# Patient Record
Sex: FEMALE | URBAN - METROPOLITAN AREA
[De-identification: names, ages, dates, MRNs, and addresses within clinical notes are randomized per-mention and may not be internally consistent; named-entity substitution may affect disease eponyms.]

---

## 2024-07-09 ENCOUNTER — TELEPHONE (OUTPATIENT)
Dept: FAMILY MEDICINE CLINIC | Facility: CLINIC | Age: 29
End: 2024-07-09

## 2024-07-09 NOTE — TELEPHONE ENCOUNTER
Add 91896 Cpt? (Important Note: In 2017 The Use Of 76576 Is Being Tracked By Cms To Determine Future Global Period Reimbursement For Global Periods): yes
Patient's  asked Dr Latham if he would take her on as a patient and he agreed. She will call to set up her appointment.  
Detail Level: Detailed

## 2024-09-11 ENCOUNTER — OFFICE VISIT (OUTPATIENT)
Dept: FAMILY MEDICINE CLINIC | Facility: CLINIC | Age: 29
End: 2024-09-11
Payer: COMMERCIAL

## 2024-09-11 VITALS
HEART RATE: 63 BPM | BODY MASS INDEX: 25.37 KG/M2 | HEIGHT: 64 IN | TEMPERATURE: 98.7 F | OXYGEN SATURATION: 97 % | DIASTOLIC BLOOD PRESSURE: 64 MMHG | WEIGHT: 148.6 LBS | SYSTOLIC BLOOD PRESSURE: 90 MMHG

## 2024-09-11 DIAGNOSIS — H60.501 ACUTE OTITIS EXTERNA OF RIGHT EAR, UNSPECIFIED TYPE: Primary | ICD-10-CM

## 2024-09-11 DIAGNOSIS — Z30.41 ORAL CONTRACEPTIVE PILL SURVEILLANCE: ICD-10-CM

## 2024-09-11 LAB
ALBUMIN SERPL-MCNC: 4.2 G/DL (ref 3.5–5.2)
ALBUMIN/GLOB SERPL: 1.7 G/DL
ALP SERPL-CCNC: 66 U/L (ref 39–117)
ALT SERPL-CCNC: 33 U/L (ref 1–33)
AST SERPL-CCNC: 20 U/L (ref 1–32)
BASOPHILS # BLD AUTO: 0.02 10*3/MM3 (ref 0–0.2)
BASOPHILS NFR BLD AUTO: 0.4 % (ref 0–1.5)
BILIRUB SERPL-MCNC: 0.3 MG/DL (ref 0–1.2)
BUN SERPL-MCNC: 11 MG/DL (ref 6–20)
BUN/CREAT SERPL: 18 (ref 7–25)
CALCIUM SERPL-MCNC: 9.1 MG/DL (ref 8.6–10.5)
CHLORIDE SERPL-SCNC: 105 MMOL/L (ref 98–107)
CHOLEST SERPL-MCNC: 153 MG/DL (ref 0–200)
CO2 SERPL-SCNC: 24.7 MMOL/L (ref 22–29)
CREAT SERPL-MCNC: 0.61 MG/DL (ref 0.57–1)
EGFRCR SERPLBLD CKD-EPI 2021: 122.8 ML/MIN/1.73
EOSINOPHIL # BLD AUTO: 0.08 10*3/MM3 (ref 0–0.4)
EOSINOPHIL NFR BLD AUTO: 1.7 % (ref 0.3–6.2)
ERYTHROCYTE [DISTWIDTH] IN BLOOD BY AUTOMATED COUNT: 12 % (ref 12.3–15.4)
GLOBULIN SER CALC-MCNC: 2.5 GM/DL
GLUCOSE SERPL-MCNC: 90 MG/DL (ref 65–99)
HCT VFR BLD AUTO: 37.2 % (ref 34–46.6)
HDLC SERPL-MCNC: 55 MG/DL (ref 40–60)
HGB BLD-MCNC: 12.4 G/DL (ref 12–15.9)
IMM GRANULOCYTES # BLD AUTO: 0.01 10*3/MM3 (ref 0–0.05)
IMM GRANULOCYTES NFR BLD AUTO: 0.2 % (ref 0–0.5)
LDLC SERPL CALC-MCNC: 82 MG/DL (ref 0–100)
LYMPHOCYTES # BLD AUTO: 1.98 10*3/MM3 (ref 0.7–3.1)
LYMPHOCYTES NFR BLD AUTO: 41.3 % (ref 19.6–45.3)
MCH RBC QN AUTO: 27.9 PG (ref 26.6–33)
MCHC RBC AUTO-ENTMCNC: 33.3 G/DL (ref 31.5–35.7)
MCV RBC AUTO: 83.6 FL (ref 79–97)
MONOCYTES # BLD AUTO: 0.26 10*3/MM3 (ref 0.1–0.9)
MONOCYTES NFR BLD AUTO: 5.4 % (ref 5–12)
NEUTROPHILS # BLD AUTO: 2.44 10*3/MM3 (ref 1.7–7)
NEUTROPHILS NFR BLD AUTO: 51 % (ref 42.7–76)
NRBC BLD AUTO-RTO: 0 /100 WBC (ref 0–0.2)
PLATELET # BLD AUTO: 281 10*3/MM3 (ref 140–450)
POTASSIUM SERPL-SCNC: 3.9 MMOL/L (ref 3.5–5.2)
PROT SERPL-MCNC: 6.7 G/DL (ref 6–8.5)
RBC # BLD AUTO: 4.45 10*6/MM3 (ref 3.77–5.28)
SODIUM SERPL-SCNC: 140 MMOL/L (ref 136–145)
TRIGL SERPL-MCNC: 83 MG/DL (ref 0–150)
TSH SERPL DL<=0.005 MIU/L-ACNC: 2.44 UIU/ML (ref 0.27–4.2)
VLDLC SERPL CALC-MCNC: 16 MG/DL (ref 5–40)
WBC # BLD AUTO: 4.79 10*3/MM3 (ref 3.4–10.8)

## 2024-09-11 PROCEDURE — 99204 OFFICE O/P NEW MOD 45 MIN: CPT

## 2024-09-11 RX ORDER — DROSPIRENONE AND ETHINYL ESTRADIOL 0.02-3(28)
1 KIT ORAL DAILY
COMMUNITY
End: 2024-09-11 | Stop reason: SDUPTHER

## 2024-09-11 RX ORDER — DROSPIRENONE AND ETHINYL ESTRADIOL 0.02-3(28)
1 KIT ORAL DAILY
Qty: 28 TABLET | Refills: 11 | Status: SHIPPED | OUTPATIENT
Start: 2024-09-11

## 2024-09-11 RX ORDER — DROSPIRENONE AND ETHINYL ESTRADIOL TABLETS 0.02-3(28)
KIT ORAL
COMMUNITY
Start: 2024-05-28 | End: 2024-09-11

## 2024-09-11 RX ORDER — NEOMYCIN SULFATE, POLYMYXIN B SULFATE AND HYDROCORTISONE 10; 3.5; 1 MG/ML; MG/ML; [USP'U]/ML
3 SUSPENSION/ DROPS AURICULAR (OTIC) 4 TIMES DAILY
Qty: 10 ML | Refills: 0 | Status: SHIPPED | OUTPATIENT
Start: 2024-09-11

## 2024-09-11 NOTE — LETTER
September 11, 2024     Patient: Kem Frederick   YOB: 1995   Date of Visit: 9/11/2024       To Whom It May Concern:    I have examined Kem Frederick. It is my medical opinion that she is cleared to return to work. Please contact me should you have any questions.          Sincerely,        ATUL Maldonado    CC: No Recipients

## 2024-09-11 NOTE — PROGRESS NOTES
"Chief Complaint  Earache    Subjective          History of Present Illness    Kem Frederick 29 y.o. female presents today for a new patient appointment. She is here to establish care, is here for their URI symptoms, and is a new patient to me. I reviewed the PFSH recorded today by my MA/LPN staff.       Ms. Frederick had a sore throat, fever, mild productive cough with yellow sputum, and bilateral ear pain last week. TMAX was 102.5 F and did improve after taking Tylenol. Covid was negative X2. She increased fluids and rested at home. Today, she reports symptoms have improved. She is still experiencing mild bilateral ear pain and clear sputum production. No fever since Thursday. She is a  with [employer].         Review of Systems   Constitutional:  Negative for chills, fatigue and fever.   HENT:  Positive for ear pain (bilateral, improved). Negative for congestion, ear discharge, hearing loss, postnasal drip, rhinorrhea, sinus pressure, sneezing, sore throat, tinnitus and trouble swallowing.    Eyes:  Negative for visual disturbance.   Respiratory:  Positive for cough (sputum production). Negative for chest tightness, shortness of breath and wheezing.    Cardiovascular:  Negative for chest pain and palpitations.   Gastrointestinal:  Negative for abdominal pain, constipation, diarrhea, nausea and vomiting.   Genitourinary:  Negative for dysuria, frequency and urgency.   Musculoskeletal:  Negative for back pain.   Skin:  Negative for rash.   Neurological:  Negative for dizziness, syncope and light-headedness.   Psychiatric/Behavioral:  Negative for behavioral problems and sleep disturbance.         Objective   Vital Signs:   BP 90/64 (BP Location: Left arm, Patient Position: Sitting, Cuff Size: Adult)   Pulse 63   Temp 98.7 °F (37.1 °C) (Oral)   Ht 162.6 cm (64\")   Wt 67.4 kg (148 lb 9.6 oz)   SpO2 97%   BMI 25.51 kg/m²      BMI is >= 25 and <30. (Overweight) The following options were offered " after discussion;: exercise counseling/recommendations and nutrition counseling/recommendations       Physical Exam  Vitals and nursing note reviewed.   Constitutional:       General: She is not in acute distress.     Appearance: She is well-developed and overweight. She is not ill-appearing.   HENT:      Head: Normocephalic and atraumatic. Hair is normal.      Right Ear: External ear normal. No decreased hearing noted. No drainage, swelling or tenderness. There is impacted cerumen (mild debris). Tympanic membrane is not injected, erythematous or retracted.      Left Ear: External ear normal. No decreased hearing noted. No drainage, swelling or tenderness. There is impacted cerumen (mild debris). Tympanic membrane is retracted. Tympanic membrane is not injected or erythematous.      Ears:      Comments: Erythema of right ear canal.     Nose: No mucosal edema or rhinorrhea.      Right Turbinates: Not enlarged or swollen.      Left Turbinates: Not enlarged or swollen.      Mouth/Throat:      Mouth: Mucous membranes are moist. No oral lesions.      Pharynx: Uvula midline. No pharyngeal swelling, oropharyngeal exudate, posterior oropharyngeal erythema or uvula swelling.      Tonsils: No tonsillar exudate or tonsillar abscesses.   Eyes:      General: No scleral icterus.        Right eye: No discharge.         Left eye: No discharge.      Conjunctiva/sclera: Conjunctivae normal.      Pupils: Pupils are equal, round, and reactive to light.   Cardiovascular:      Rate and Rhythm: Normal rate and regular rhythm.      Heart sounds: Normal heart sounds.   Pulmonary:      Effort: No respiratory distress.      Breath sounds: Normal breath sounds. No stridor or decreased air movement. No decreased breath sounds, wheezing, rhonchi or rales.   Musculoskeletal:      Cervical back: Normal range of motion and neck supple.   Lymphadenopathy:      Cervical: No cervical adenopathy.   Skin:     General: Skin is warm and dry.       Findings: No rash.   Neurological:      Mental Status: She is alert and oriented to person, place, and time.      Motor: No abnormal muscle tone.   Psychiatric:         Mood and Affect: Mood normal.         Behavior: Behavior normal.                         Assessment and Plan      Assessment & Plan  Acute otitis externa of right ear, unspecified type  Eardrops as directed  Keep ears clean and dry, avoid Q-tips  Return if no improvement  Oral contraceptive pill surveillance  OCP's as directed    Orders Placed This Encounter   Procedures    Comprehensive metabolic panel    Lipid panel    TSH    CBC and Differential     New Medications Ordered This Visit   Medications    neomycin-polymyxin-hydrocortisone (CORTISPORIN) 3.5-68628-6 otic suspension     Sig: Administer 3 drops to the right ear 4 (Four) Times a Day. For 7 days     Dispense:  10 mL     Refill:  0    drospirenone-ethinyl estradiol (LILO) 3-0.02 MG per tablet     Sig: Take 1 tablet by mouth Daily.     Dispense:  28 tablet     Refill:  11              Follow Up     Return if symptoms worsen or fail to improve.    Patient was given instructions and counseling regarding her condition or for health maintenance advice. Please see specific information pulled into the AVS if appropriate.

## 2024-11-04 ENCOUNTER — OFFICE VISIT (OUTPATIENT)
Dept: FAMILY MEDICINE CLINIC | Facility: CLINIC | Age: 29
End: 2024-11-04
Payer: COMMERCIAL

## 2024-11-04 VITALS
SYSTOLIC BLOOD PRESSURE: 110 MMHG | WEIGHT: 145 LBS | HEART RATE: 68 BPM | DIASTOLIC BLOOD PRESSURE: 66 MMHG | BODY MASS INDEX: 24.75 KG/M2 | OXYGEN SATURATION: 99 % | HEIGHT: 64 IN

## 2024-11-04 DIAGNOSIS — H92.03 OTALGIA OF BOTH EARS: Primary | ICD-10-CM

## 2024-11-04 DIAGNOSIS — R06.00 PND (PAROXYSMAL NOCTURNAL DYSPNEA): ICD-10-CM

## 2024-11-04 PROCEDURE — 99213 OFFICE O/P EST LOW 20 MIN: CPT | Performed by: FAMILY MEDICINE

## 2024-11-04 RX ORDER — NAPROXEN SODIUM 220 MG
220 TABLET ORAL 2 TIMES DAILY PRN
Start: 2024-11-04 | End: 2025-05-03

## 2024-11-04 RX ORDER — FEXOFENADINE HCL 180 MG/1
180 TABLET ORAL DAILY PRN
COMMUNITY
Start: 2024-11-04

## 2024-11-04 NOTE — PROGRESS NOTES
"Chief Complaint  Office Visit (Needs note to go back to work, from headache/earpain)    Subjective        HPI      The patient presents for evaluation of ear pain.    She is seeking a medical note to return to work. She experienced mild ear pain a few days ago, which has since resolved. She also had a cold and fever during her last visit on 09/11/2024. She reports no current symptoms of dizziness, fever, sweats, or chills. She denies any sensation of clogged ears, cough, or shortness of breath. Her primary symptom was mild dizziness accompanied by ear pain, with the left ear being more affected than the right.    SOCIAL HISTORY  She works as a .           Vital Signs:   Vitals:    11/04/24 1555   BP: 110/66   Pulse: 68   SpO2: 99%   Weight: 65.8 kg (145 lb)   Height: 162.6 cm (64\")            9/11/2024     8:26 AM   PHQ-2/PHQ-9 Depression Screening   Retired Little Interest or Pleasure in Doing Things 0-->not at all   Retired Feeling Down, Depressed or Hopeless 0-->not at all   Retired PHQ-9: Brief Depression Severity Measure Score 0       BMI is within normal parameters. No other follow-up for BMI required.        Physical Exam  Vitals reviewed.   Constitutional:       General: She is not in acute distress.  HENT:      Right Ear: Tympanic membrane and ear canal normal.      Left Ear: Tympanic membrane and ear canal normal.      Nose: No congestion or rhinorrhea.      Mouth/Throat:      Lips: Pink.      Mouth: Mucous membranes are moist. No oral lesions.      Dentition: Normal dentition.      Tongue: No lesions.      Pharynx: Postnasal drip present. No posterior oropharyngeal erythema.   Cardiovascular:      Rate and Rhythm: Normal rate and regular rhythm.      Heart sounds: Normal heart sounds.   Pulmonary:      Effort: Pulmonary effort is normal.      Breath sounds: Normal breath sounds.   Neurological:      General: No focal deficit present.      Mental Status: She is alert.   Psychiatric:         " Attention and Perception: Attention normal.         Mood and Affect: Mood normal.         Behavior: Behavior normal.          Ears and throat appear normal.             Results                  Assessment and Plan     Assessment & Plan  Otalgia of both ears  Condition resolved.  Cleared for resumption of work.  Note given.  Follow-up as needed.  Advised patient to use Allegra as needed over the next week and to use Aleve up to 60 minutes before flying over the next week to help with any remaining inflammation  Orders:    fexofenadine (Allegra Allergy) 180 MG tablet; Take 1 tablet by mouth Daily As Needed (allergy or congestion).    Aleve 220 MG tablet; Take 1 tablet by mouth 2 (Two) Times a Day As Needed (Prevention of ear congestion prior to flying) for up to 180 days. Take medication with food    PND (paroxysmal nocturnal dyspnea)    Orders:    fexofenadine (Allegra Allergy) 180 MG tablet; Take 1 tablet by mouth Daily As Needed (allergy or congestion).    Aleve 220 MG tablet; Take 1 tablet by mouth 2 (Two) Times a Day As Needed (Prevention of ear congestion prior to flying) for up to 180 days. Take medication with food        Return if symptoms worsen or fail to improve.     Patient was given instructions and counseling regarding her condition or for health maintenance advice. Please see specific information pulled into the AVS if appropriate.     Patient or patient representative verbalized consent for the use of Ambient Listening during the visit with  Scott Lisa MD for chart documentation. 11/4/2024  16:29 EST

## 2024-11-04 NOTE — LETTER
November 4, 2024     Patient: Kem Frederick   YOB: 1995   Date of Visit: 11/4/2024       To Whom It May Concern:    It is my medical opinion that Kem Frederick may return to work immediately with no work restrictions..         Sincerely,        Scott Lisa MD    CC: No Recipients

## 2025-04-28 ENCOUNTER — OFFICE VISIT (OUTPATIENT)
Dept: FAMILY MEDICINE CLINIC | Facility: CLINIC | Age: 30
End: 2025-04-28
Payer: COMMERCIAL

## 2025-04-28 ENCOUNTER — TELEPHONE (OUTPATIENT)
Dept: FAMILY MEDICINE CLINIC | Facility: CLINIC | Age: 30
End: 2025-04-28

## 2025-04-28 VITALS
HEART RATE: 75 BPM | HEIGHT: 64 IN | SYSTOLIC BLOOD PRESSURE: 110 MMHG | WEIGHT: 147 LBS | OXYGEN SATURATION: 98 % | TEMPERATURE: 98.1 F | BODY MASS INDEX: 25.1 KG/M2 | DIASTOLIC BLOOD PRESSURE: 70 MMHG

## 2025-04-28 DIAGNOSIS — G89.29 CHRONIC NONINTRACTABLE HEADACHE, UNSPECIFIED HEADACHE TYPE: Primary | ICD-10-CM

## 2025-04-28 DIAGNOSIS — J30.9 CHRONIC ALLERGIC RHINITIS: ICD-10-CM

## 2025-04-28 DIAGNOSIS — Z30.41 ORAL CONTRACEPTIVE PILL SURVEILLANCE: ICD-10-CM

## 2025-04-28 DIAGNOSIS — R51.9 CHRONIC NONINTRACTABLE HEADACHE, UNSPECIFIED HEADACHE TYPE: Primary | ICD-10-CM

## 2025-04-28 PROCEDURE — 99214 OFFICE O/P EST MOD 30 MIN: CPT

## 2025-04-28 RX ORDER — OLOPATADINE HYDROCHLORIDE 1 MG/ML
1 SOLUTION/ DROPS OPHTHALMIC 2 TIMES DAILY
Qty: 5 ML | Refills: 12 | Status: SHIPPED | OUTPATIENT
Start: 2025-04-28

## 2025-04-28 RX ORDER — DROSPIRENONE AND ETHINYL ESTRADIOL 0.02-3(28)
1 KIT ORAL DAILY
Qty: 28 TABLET | Refills: 11 | Status: SHIPPED | OUTPATIENT
Start: 2025-04-28

## 2025-04-28 NOTE — PROGRESS NOTES
"Chief Complaint  Headache (FMLA)    Subjective          Headache         Wooyjesse Frederick 29 y.o. female presents today to initiate FMLA paperwork due to headaches.    Ms. Frederick reports headaches up to 8-10 days per month. She is a . Headaches are exacerbated by flying and triggered by dust. She manages headaches with allergy medication, rest, and sleep. She reports this regimen manages headaches well.     She is requesting 6-8 days per month, up to 13 hours per day.    Ms. Frederick is also here to refill medications. No medication side effects are reported.       Review of Systems   Constitutional:  Negative for chills, fatigue and fever.   HENT:  Negative for congestion and sinus pressure.    Eyes:  Negative for visual disturbance.   Respiratory:  Negative for cough, chest tightness and shortness of breath.    Cardiovascular:  Negative for chest pain and palpitations.   Gastrointestinal:  Negative for abdominal pain, constipation, diarrhea, nausea and vomiting.   Genitourinary:  Negative for dysuria, frequency and urgency.   Musculoskeletal:  Negative for back pain.   Skin:  Negative for rash.   Neurological:  Negative for dizziness, seizures, syncope, speech difficulty, weakness, light-headedness and numbness.        Headaches   Psychiatric/Behavioral:  Negative for behavioral problems and sleep disturbance.         Objective   Vital Signs:   /70 (BP Location: Right arm, Patient Position: Sitting, Cuff Size: Adult)   Pulse 75   Temp 98.1 °F (36.7 °C) (Oral)   Ht 162.6 cm (64\")   Wt 66.7 kg (147 lb)   SpO2 98%   BMI 25.23 kg/m²      BMI is >= 25 and <30. (Overweight) The following options were offered after discussion;: exercise counseling/recommendations and nutrition counseling/recommendations       Physical Exam  Vitals and nursing note reviewed.   Constitutional:       General: She is not in acute distress.     Appearance: She is well-developed and overweight.   HENT:      Head: Normocephalic. "   Eyes:      General: No allergic shiner or scleral icterus.     Conjunctiva/sclera:      Right eye: Right conjunctiva is not injected.      Left eye: Left conjunctiva is not injected.      Pupils: Pupils are equal, round, and reactive to light.   Neck:      Vascular: No carotid bruit.   Cardiovascular:      Rate and Rhythm: Normal rate and regular rhythm.      Heart sounds: Normal heart sounds.   Pulmonary:      Effort: Pulmonary effort is normal. No respiratory distress.      Breath sounds: Normal breath sounds. No stridor.   Musculoskeletal:         General: No deformity.   Skin:     General: Skin is warm.      Coloration: Skin is not jaundiced.   Neurological:      General: No focal deficit present.      Mental Status: She is alert and oriented to person, place, and time.   Psychiatric:         Mood and Affect: Mood normal.         Behavior: Behavior normal.                    Results                 Assessment and Plan                 Chronic nonintractable headache, unspecified headache type  Overall well-managed.  Continue current regimen.  FMLA paperwork initiated.  Continue allergy eyedrops and avoid allergen triggers to help control headaches.  Return if symptoms worsen.    Orders:    Comprehensive metabolic panel    Lipid panel    CBC and Differential    TSH    Chronic allergic rhinitis  Overall well-managed.  Continue current regimen.  Avoid triggers.  Follow-up as needed.    Orders:    Comprehensive metabolic panel    Lipid panel    CBC and Differential    TSH    olopatadine (PATANOL) 0.1 % ophthalmic solution; Administer 1 drop to both eyes 2 (Two) Times a Day.    Oral contraceptive pill surveillance  Continue OCP medication as directed.    Orders:    Comprehensive metabolic panel    TSH    drospirenone-ethinyl estradiol (LILO) 3-0.02 MG per tablet; Take 1 tablet by mouth Daily.             Follow Up     Return as needed.    Patient was given instructions and counseling regarding her condition or for  health maintenance advice. Please see specific information pulled into the AVS if appropriate.

## 2025-05-21 ENCOUNTER — TELEPHONE (OUTPATIENT)
Dept: FAMILY MEDICINE CLINIC | Facility: CLINIC | Age: 30
End: 2025-05-21

## 2025-05-21 NOTE — TELEPHONE ENCOUNTER
PATIENT CALLED AND STATES ADDITIONAL INFORMATION IS NEEDED ON FMLA PAPERWORK ON PAGE 3 #9 NEEDS TO BE CHANGED TO 3 TIMES PER MONTH, ESTIMATED 4 DAYS PER EPISODE.  ALSO WILL NEED TO BE INITIALED AND EDITING DATE.    SHE WILL PICKUP TOMORROW AROUND NOON IF POSSIBLE  CALL BACK NUMBER [phone]

## 2025-07-08 ENCOUNTER — TELEPHONE (OUTPATIENT)
Dept: FAMILY MEDICINE CLINIC | Facility: CLINIC | Age: 30
End: 2025-07-08
Payer: COMMERCIAL

## 2025-07-08 NOTE — TELEPHONE ENCOUNTER
Caller: Kem Frederick    Relationship: Self    Best call back number: [phone]     What is the best time to reach you: ANYTIME    Who are you requesting to speak with (clinical staff, provider,  specific staff member): CLINICAL    What was the call regarding: PATIENT CALLING TO FOLLOW UP ON THE [place] PAPERWORK SHE STATES THAT IT IS DUE TODAY AND NEEDING TO  TODAY AS WELL    Is it okay if the provider responds through MyChart: NO

## 2025-07-14 NOTE — TELEPHONE ENCOUNTER
Called and spoke with pt, informed her the FMLA was done. Pt wants to pick it up. They will be up front for her to .